# Patient Record
Sex: FEMALE | Race: BLACK OR AFRICAN AMERICAN | URBAN - METROPOLITAN AREA
[De-identification: names, ages, dates, MRNs, and addresses within clinical notes are randomized per-mention and may not be internally consistent; named-entity substitution may affect disease eponyms.]

---

## 2020-09-23 ENCOUNTER — OFFICE VISIT (OUTPATIENT)
Dept: BARIATRICS/WEIGHT MGMT | Age: 35
End: 2020-09-23
Payer: COMMERCIAL

## 2020-09-23 VITALS
BODY MASS INDEX: 39.21 KG/M2 | SYSTOLIC BLOOD PRESSURE: 126 MMHG | WEIGHT: 273.9 LBS | HEART RATE: 87 BPM | TEMPERATURE: 98 F | HEIGHT: 70 IN | DIASTOLIC BLOOD PRESSURE: 84 MMHG | OXYGEN SATURATION: 97 %

## 2020-09-23 PROCEDURE — G8417 CALC BMI ABV UP PARAM F/U: HCPCS | Performed by: NURSE PRACTITIONER

## 2020-09-23 PROCEDURE — 99204 OFFICE O/P NEW MOD 45 MIN: CPT | Performed by: NURSE PRACTITIONER

## 2020-09-23 PROCEDURE — G8427 DOCREV CUR MEDS BY ELIG CLIN: HCPCS | Performed by: NURSE PRACTITIONER

## 2020-09-23 PROCEDURE — 1036F TOBACCO NON-USER: CPT | Performed by: NURSE PRACTITIONER

## 2020-09-23 RX ORDER — ASCORBIC ACID 500 MG
500 TABLET ORAL DAILY
COMMUNITY

## 2020-09-23 SDOH — HEALTH STABILITY: MENTAL HEALTH: HOW OFTEN DO YOU HAVE A DRINK CONTAINING ALCOHOL?: NEVER

## 2020-09-23 NOTE — PROGRESS NOTES
Subjective     Chief Complaint   Patient presents with    Weight Management     New Medication Consult       Vitals:    09/23/20 0921   BP: 126/84   Pulse: 87   Temp: 98 °F (36.7 °C)   SpO2: 97%     Wt Readings from Last 3 Encounters:   09/23/20 273 lb 14.4 oz (124.2 kg)     Weight Loss Program History:  The patient is a 30 y.o. female being seen regarding our weight loss program.  The patient's PCP is Ian Lynne) and presents on new referral.  The patient first recognized that she had a weight problem about 10 years ago. The patient's lowest weight in the last five years was 220 lbs, and the highest weight in the last five years was 275 lbs. Argelia's current Body mass index is 39.3 kg/m² (9/23/20). HPI:  The patient states she has tried personal training, self dieting. These attempts have not been successful with weight loss, but have failed to sustain adequate weight loss. No Psychiatric history. History of eating disorders  Only on MVIs daily. No medication hx. No prior weight loss medications.  3 times per week, started in July. Consistent, no current weight loss. No specific diet plan, lunch coffee and granola bar or sandwich. Dinner usually pasta/vegetables. Water intake is good. No fried foods. Rarely eats out. Comorbid Conditions:  Significant diseases effecting this patient are History reviewed. No pertinent past medical history. .    Thoroughly reviewed the patient's medical history, family history, social history and review of systems with the patient today in the office. Please see medical record for pertinent positives. Allergies:   Allergies   Allergen Reactions    Ibuprofen Hives and Itching           Sulfa Antibiotics Hives and Itching     Body shaking       Past Surgical History:  Past Surgical History:   Procedure Laterality Date    CHOLECYSTECTOMY  12/2019    MENISCECTOMY Right 07/2018       Family History:  Family History   Problem pattern changes, mood swings or polydipsia/polyuria  Respiratory ROS: no cough, shortness of breath, or wheezing  Cardiovascular ROS: no chest pain or dyspnea on exertion  Gastrointestinal ROS: no abdominal pain, change in bowel habits, or black or bloody stools  Genito-Urinary ROS: no dysuria, incontinence, trouble voiding, or hematuria  Musculoskeletal ROS: Negative for joint pain or myalgia  Neurological ROS: negative for - behavioral changes, dizziness, headaches, impaired coordination/balance, numbness/tingling or seizures  Dermatological ROS: negative for - eczema or nail changes  Psychological ROS: negative for - anxiety, depression or suicidal ideation     Objective     Physical Exam   Constitutional: Oriented to person, place, and time and well-developed, well-nourished, and in no distress. No distress. Obese   HENT:   Head: Normocephalic and atraumatic. Eyes: Conjunctivae are normal. Pupils are equal, round, and reactive to light. Neck: Normal range of motion. Neck supple. Cardiovascular: Normal rate, regular rhythm, normal heart sounds and intact distal pulses. No murmur heard. Pulmonary/Chest: Effort normal and breath sounds normal. No respiratory distress. Abdominal: Soft. Bowel sounds are normal. Exhibits no distension. There is no tenderness. Large. Musculoskeletal: Exhibits no edema or tenderness. Lymphadenopathy: Deferred. Neurological: She is alert and oriented to person, place, and time. No cranial nerve deficit. Skin: Skin is warm. She is not diaphoretic. Psychiatric: Mood, memory, affect and judgment normal.     Assessment  and Plan        Obesity with a BMI of Body mass index is 39.3 kg/m². Discussed candidacy for weight loss medication: BMI of >30 at 39. No history of CAD, uncontrolled HTN, hyperthyroidism, MAOI therapy, glaucoma or hx of drug abuse. Discussed with patient, medication only approved for short term, 12 weeks.     Patient is a good candidate for Adipex weight loss medication, along with nutrition consult, and detailed diet plan. Patient given medication handout today and discussed the below information. Discussed possible side effects with patient in length. Most common side effects discussed but not limited to; insomnia, dry mouth, constipation, nervousness, increase in HR and HTN. Patient was informed to call with any of the above side effects or other concerns. Patient aware of the medication compliance guidelines below and will be removed from Adipex if the patient does not comply;   - Must meet minimum monthly with provider, weekly when starting adipex x1 month. - Current birth control measures none discussed. - Urine drug with no past hx of any substance abuse. - OARRS. - Cannot be break in therapy, 3 months unless (illness of patient or unavailability of provider and needs to be documented). - Any concerns of ETOH or drug abuse must d/c. Patient aware, Needs to show at least monthly weight progress with weigh ins. Plan    1. Morbid obesity due to excess calories ([address]  - See below. - Vitamin D 25 Hydroxy; Future  - Vitamin B12 & Folate; Future  - TSH without Reflex; Future  - Comprehensive Metabolic Panel; Future  - CBC Auto Differential; Future  - Lipid Panel; Future  - EKG 12 Lead; Future  - Urine Drug Screen; Future  - Pregnancy, Urine; Future    2. Weight gain  - Planning medication management. - Discussed weight loss program with patient and the metabolic components of obesity and insulin resistance over time. - Ultimately will need to change overall eating behaviors to have success in continued management with weight loss. - Will continue to journal food items and discussed the below recommendations to patient. - All questions answered and overall appears very receptive.   - Planning adipex and then qsymia. Given handouts. Needs labs, EKG, UDS and urine preg. RTC for virtual class in 2 wks.      Patient was encouraged to journal all food intake using myfitness pal. Keep calorie level at approximately 3689-0643. Protein intake is to be a minimum of 60 grams per day. Water drinking was encouraged with a goal of 64oz-128oz daily. Beverages to be calorie free except for milk. Every other beverage should be water. They are to avoid soda. Continue to increase level of physical activity. I spent 45 minutes with patient and more than 50% of the office visit today was spent in face to face counseling regarding diet and exercise, counting calories, complying with the diet recommendations. Patient counseled on the benefits of treatment plan at length while in the office today. Patient states an understanding and willingness to proceed with the recommended weight loss plan. No orders of the defined types were placed in this encounter. Orders Placed This Encounter   Procedures    Vitamin D 25 Hydroxy     Standing Status:   Future     Standing Expiration Date:   9/23/2021    Vitamin B12 & Folate     Standing Status:   Future     Standing Expiration Date:   9/23/2021    TSH without Reflex     Standing Status:   Future     Standing Expiration Date:   9/23/2021    Comprehensive Metabolic Panel     Standing Status:   Future     Standing Expiration Date:   9/23/2021    CBC Auto Differential     Standing Status:   Future     Standing Expiration Date:   9/23/2021    Lipid Panel     Standing Status:   Future     Standing Expiration Date:   9/23/2021     Order Specific Question:   Is Patient Fasting?/# of Hours     Answer:   12    Urine Drug Screen     Standing Status:   Future     Standing Expiration Date:   9/23/2021    Pregnancy, Urine     Standing Status:   Future     Standing Expiration Date:   9/23/2021    EKG 12 Lead     Standing Status:   Future     Standing Expiration Date:   11/22/2020     Order Specific Question:   Reason for Exam?     Answer:    Other     Comments:   medication management       Follow Up:  Return in about 2 weeks (around 10/7/2020) for New Medication Group Class.     Yifan Griggs, CNP

## 2020-09-24 LAB
6-ACETYLMORPHINE, UR: NEGATIVE
ALBUMIN SERPL-MCNC: 3.9 G/DL
ALP BLD-CCNC: 62 U/L
ALT SERPL-CCNC: 15 U/L
AMPHETAMINE SCREEN, URINE: NEGATIVE
ANION GAP SERPL CALCULATED.3IONS-SCNC: 10 MMOL/L
AST SERPL-CCNC: 16 U/L
BARBITURATE SCREEN, URINE: NEGATIVE
BASOPHILS ABSOLUTE: 0.04 /ΜL
BASOPHILS RELATIVE PERCENT: 1 %
BENZODIAZEPINE SCREEN, URINE: NEGATIVE
BILIRUB SERPL-MCNC: 0.4 MG/DL (ref 0.1–1.4)
BUN BLDV-MCNC: 8 MG/DL
CALCIUM SERPL-MCNC: 9.5 MG/DL
CANNABINOID SCREEN URINE: NEGATIVE
CHLORIDE BLD-SCNC: 107 MMOL/L
CHOLESTEROL, TOTAL: 211 MG/DL
CHOLESTEROL/HDL RATIO: 3.3
CO2: 25 MMOL/L
COCAINE METABOLITE, URINE: NEGATIVE
CREAT SERPL-MCNC: 0.76 MG/DL
CREATININE URINE: NORMAL
EDDP, URINE: NEGATIVE
EOSINOPHILS ABSOLUTE: 0.22 /ΜL
EOSINOPHILS RELATIVE PERCENT: 5.4 %
ETHANOL URINE: NEGATIVE
FOLATE: 15.39
GFR CALCULATED: >60
GLUCOSE BLD-MCNC: 89 MG/DL
HCT VFR BLD CALC: 35 % (ref 36–46)
HDLC SERPL-MCNC: 63 MG/DL (ref 35–70)
HEMOGLOBIN: 9.8 G/DL (ref 12–16)
LDL CHOLESTEROL CALCULATED: 137 MG/DL (ref 0–160)
LYMPHOCYTES ABSOLUTE: 1.63 /ΜL
LYMPHOCYTES RELATIVE PERCENT: 40 %
MAGNESIUM: 1.7 MG/DL
MCH RBC QN AUTO: 22.2 PG
MCHC RBC AUTO-ENTMCNC: 28 G/DL
MCV RBC AUTO: 79.2 FL
MDMA URINE: NEGATIVE
METHADONE SCREEN, URINE: NEGATIVE
METHAMPHETAMINE, URINE: NEGATIVE
MONOCYTES ABSOLUTE: 0.5 /ΜL
MONOCYTES RELATIVE PERCENT: 12.3 %
NEUTROPHILS ABSOLUTE: 1.68 /ΜL
NEUTROPHILS RELATIVE PERCENT: 41.3 %
NONHDLC SERPL-MCNC: 148 MG/DL
OPIATES, URINE: NEGATIVE
OXYCODONE: NEGATIVE
PCP: NEGATIVE
PDW BLD-RTO: 17.2 %
PH, URINE: NORMAL
PHENCYCLIDINE, URINE: NEGATIVE
PLATELET # BLD: 315 K/ΜL
PMV BLD AUTO: 11.1 FL
POTASSIUM SERPL-SCNC: 4.9 MMOL/L
PROPOXYPHENE, URINE: NEGATIVE
RBC # BLD: 4.42 10^6/ΜL
SODIUM BLD-SCNC: 137 MMOL/L
TOTAL PROTEIN: 6.6
TRICYCLIC ANTIDEPRESSANTS, UR: NEGATIVE
TRIGL SERPL-MCNC: 53 MG/DL
TSH SERPL DL<=0.05 MIU/L-ACNC: 0.86 UIU/ML
VITAMIN B-12: 477
VITAMIN D 25-HYDROXY: 21.9
VITAMIN D2, 25 HYDROXY: ABNORMAL
VITAMIN D3,25 HYDROXY: ABNORMAL
VLDLC SERPL CALC-MCNC: ABNORMAL MG/DL
WBC # BLD: 4.07 10^3/ML

## 2020-10-05 ENCOUNTER — TELEMEDICINE (OUTPATIENT)
Dept: BARIATRICS/WEIGHT MGMT | Age: 35
End: 2020-10-05
Payer: COMMERCIAL

## 2020-10-05 PROCEDURE — G8428 CUR MEDS NOT DOCUMENT: HCPCS | Performed by: NURSE PRACTITIONER

## 2020-10-05 PROCEDURE — 99215 OFFICE O/P EST HI 40 MIN: CPT | Performed by: NURSE PRACTITIONER

## 2020-10-05 ASSESSMENT — ENCOUNTER SYMPTOMS
EYE PAIN: 0
ABDOMINAL DISTENTION: 0
NAUSEA: 0
DIARRHEA: 0
CHEST TIGHTNESS: 0
SHORTNESS OF BREATH: 0
RHINORRHEA: 0
ABDOMINAL PAIN: 0
WHEEZING: 0
TROUBLE SWALLOWING: 0

## 2020-10-05 NOTE — PROGRESS NOTES
10/5/2020    TELEHEALTH EVALUATION -- Audio/Visual (During WAAZN-88 public health emergency)    HPI:    Samy Brower (:  1985) has requested an audio/video evaluation for the following concern(s):    Patient had an initial individual provider consult and is here for their virtual education class. At initial consult weight loss medication adipex was decided based on current BMI, prior attempts, and exclusion criteria based on past medical history. Patient had work up completed and was reviewed with patient today. EKG and workup normal. Was having palpitations with covid. Will have holtzer monitor. Review of Systems   Constitutional: Negative. Negative for appetite change, fatigue and fever. HENT: Negative for congestion, dental problem, hearing loss, rhinorrhea and trouble swallowing. Eyes: Negative for pain. Respiratory: Negative for chest tightness, shortness of breath and wheezing. Cardiovascular: Negative for chest pain, palpitations and leg swelling. Gastrointestinal: Negative for abdominal distention, abdominal pain, diarrhea and nausea. Endocrine: Negative for cold intolerance and polydipsia. Genitourinary: Negative for difficulty urinating and frequency. Musculoskeletal: Negative for arthralgias and gait problem. Skin: Negative for rash. Allergic/Immunologic: Negative for environmental allergies. Neurological: Negative for dizziness, seizures and syncope. Hematological: Does not bruise/bleed easily. Psychiatric/Behavioral: Negative for behavioral problems and suicidal ideas. Prior to Visit Medications    Medication Sig Taking?  Authorizing Provider   ELDERBERRY PO Take by mouth  Historical Provider, MD   vitamin C (ASCORBIC ACID) 500 MG tablet Take 500 mg by mouth daily  Historical Provider, MD   Multiple Vitamins-Minerals (MULTIPLE VITAMINS/WOMENS PO) Take by mouth  Historical Provider, MD       Social History     Tobacco Use    Smoking status: Never Smoker  Smokeless tobacco: Never Used   Substance Use Topics    Alcohol use: Never     Frequency: Never    Drug use: Never        Allergies   Allergen Reactions    Ibuprofen Hives and Itching           Sulfa Antibiotics Hives and Itching     Body shaking   , No past medical history on file.,   Past Surgical History:   Procedure Laterality Date    CHOLECYSTECTOMY  12/2019    MENISCECTOMY Right 07/2018       PHYSICAL EXAMINATION:  Physical Exam  Constitutional:       Appearance: She is well-developed. Comments: Obese   HENT:      Head: Normocephalic and atraumatic. Right Ear: Hearing and ear canal normal.      Left Ear: Hearing and ear canal normal.      Nose: Nose normal.      Eyes:      Conjunctiva/sclera: Conjunctivae normal.   Neck:      Musculoskeletal: Normal range of motion. Cardiovascular:   N/A  Pulmonary:      Effort: Pulmonary effort is normal.   Musculoskeletal: Normal range of motion. Comments: In all 4 extremities. Skin:     General: Skin is warm and dry. Findings: No rash. Neurological:      Mental Status: She is alert and oriented to person, place, and time. GCS: GCS eye subscore is 4. GCS verbal subscore is 5. GCS motor subscore is 6. Motor: No abnormal muscle tone. Psychiatric:         Behavior: Behavior normal.         Judgment: Judgment normal.     Limited due to virtual visit. ASSESSMENT/PLAN:  1. Morbid obesity due to excess calories ([address]   - Planning adipex and then qsymia once holtzer monitor completed and normal per PCP. Will review report once completed. Patient aware and will inform us once this is completed. 2. Weight gain    - Patient attended weight loss medication virtual class today. - Educated on importance of 2 forms of contraception, patient verbalizes. - Discussed possible side effects with patient in length via power point.    - Will monitor weight and vital signs weekly.  Patient informed of importance and compliance with weekly weight and vital checks. - Obesity with a BMI of 39.   - Patient educated in depth on defining obesity and the risk factors associated when BMI >30 via power point.     - Recommend MVI Ca/Vit D daily.   - Pt instructed on healthy diet to support weight loss. Instructed on goal calorie intake, not less than 1,000 kcals daily. Educated on healthy diet framework to include adequate lean protein, non-starchy vegetables, and moderate carbohydrate and fruit intake. - Instructed on fluid intake at least 64 oz daily. Patient instructed to refrain from any calorie enriched drinks. Recommend using meal replacements, 1-3 daily to support maintaining adequate protein and calorie goals. - Patient also educated on celebrate products sold in house and appropriate recommendations for vitamins and meal replacement shakes. - Patient was also informed on weight loss program and specific requirements to be met by all. Weight loss program contract signed prior. Patient aware of the medication compliance guidelines and will be removed from medication if the patient does not comply and medication handout given today. Patient was seen with total face to face time of 40 minutes in a group setting. More than 50% of this visit was counseling on obesity, strict diet recommendations, exercise, current medication usage, possible side effects, nutrition and education as above in my assessment and plan section of my note. Return in about 1 month (around 11/5/2020) for Weight Check. Poonam Chinchilla is a 30 y.o. female being evaluated by a Virtual Visit (video visit) encounter to address concerns as mentioned above. A caregiver was present when appropriate. Due to this being a TeleHealth encounter (During NZJIY-24 public health emergency), evaluation of the following organ systems was limited: Vitals/Constitutional/EENT/Resp/CV/GI//MS/Neuro/Skin/Heme-Lymph-Imm.   Pursuant to the emergency declaration under the [facility] waiver authority and the [employer] and [employer] Act, this Virtual Visit was conducted with patient's (and/or legal guardian's) consent, to reduce the patient's risk of exposure to COVID-19 and provide necessary medical care. The patient (and/or legal guardian) has also been advised to contact this office for worsening conditions or problems, and seek emergency medical treatment and/or call 911 if deemed necessary. Patient identification was verified at the start of the visit: Yes    Total time spent on this encounter: 36    Services were provided through a video synchronous discussion virtually to substitute for in-person clinic visit. Patient and provider were located at their individual homes.     --VIKASH Daniels CNP on 10/5/2020 at 8:50 AM

## 2021-03-22 ENCOUNTER — OFFICE VISIT (OUTPATIENT)
Dept: BARIATRICS/WEIGHT MGMT | Age: 36
End: 2021-03-22
Payer: COMMERCIAL

## 2021-03-22 VITALS
SYSTOLIC BLOOD PRESSURE: 120 MMHG | BODY MASS INDEX: 37.69 KG/M2 | HEART RATE: 87 BPM | OXYGEN SATURATION: 98 % | DIASTOLIC BLOOD PRESSURE: 72 MMHG | TEMPERATURE: 98.6 F | WEIGHT: 263.3 LBS | HEIGHT: 70 IN

## 2021-03-22 DIAGNOSIS — R63.5 WEIGHT GAIN: Primary | ICD-10-CM

## 2021-03-22 DIAGNOSIS — E66.01 MORBID OBESITY DUE TO EXCESS CALORIES (HCC): ICD-10-CM

## 2021-03-22 PROCEDURE — 1036F TOBACCO NON-USER: CPT | Performed by: NURSE PRACTITIONER

## 2021-03-22 PROCEDURE — 99214 OFFICE O/P EST MOD 30 MIN: CPT | Performed by: NURSE PRACTITIONER

## 2021-03-22 PROCEDURE — G8484 FLU IMMUNIZE NO ADMIN: HCPCS | Performed by: NURSE PRACTITIONER

## 2021-03-22 PROCEDURE — G8417 CALC BMI ABV UP PARAM F/U: HCPCS | Performed by: NURSE PRACTITIONER

## 2021-03-22 PROCEDURE — G8427 DOCREV CUR MEDS BY ELIG CLIN: HCPCS | Performed by: NURSE PRACTITIONER

## 2021-03-22 ASSESSMENT — ENCOUNTER SYMPTOMS
TROUBLE SWALLOWING: 0
CHEST TIGHTNESS: 0
ABDOMINAL PAIN: 0
NAUSEA: 0
DIARRHEA: 0
EYE PAIN: 0
SHORTNESS OF BREATH: 0
ABDOMINAL DISTENTION: 0
RHINORRHEA: 0
WHEEZING: 0

## 2021-03-22 NOTE — PROGRESS NOTES
[facility]  1985  30 y.o. SUBJECT LUISITO:    Chief Complaint   Patient presents with    Other     Office visit extended  Restart medication     History reviewed. No pertinent past medical history. Past Surgical History:   Procedure Laterality Date    CHOLECYSTECTOMY  12/2019    MENISCECTOMY Right 07/2018     Current Outpatient Medications   Medication Sig Dispense Refill    Phentermine-Topiramate 3.75-23 MG CP24 Take 1 tablet by mouth daily for 14 days. Take in am. 14 capsule 0    Phentermine-Topiramate 7.5-46 MG CP24 Take 1 tablet by mouth daily for 30 days. Take in am. 30 capsule 0    ELDERBERRY PO Take by mouth      vitamin C (ASCORBIC ACID) 500 MG tablet Take 500 mg by mouth daily      Multiple Vitamins-Minerals (MULTIPLE VITAMINS/WOMENS PO) Take by mouth       No current facility-administered medications for this visit. Family History   Problem Relation Age of Onset    Lupus Mother     Cancer Mother         non hodgkin's lymphoma    Cancer Father         Prostate    Lupus Sister     Mult Sclerosis Sister     Alzheimer's Disease Maternal Grandmother      Allergies   Allergen Reactions    Ibuprofen Hives and Itching           Sulfa Antibiotics Hives and Itching     Body shaking        HPI  Patient presents today for follow up visit. Wanting to restart program. Recently completed adipex. States lost minimal weight. No calories in drinks. Water intake is good. Patient dines out to a sit down restaurant 1 times per month. Patient eats fast food meals 0 times per week. Drinks mostly water coffee tea    24 hour recall/food frequency chart:  Breakfast: oatmeal and coffee  Snack: none  Lunch: none  Snack: none  Dinner: steak, broccoli, mac and cheese  Snack: none    Total daily calories: 1500/1400      Exercises twice weekly. Review of Systems   Constitutional: Negative. Negative for appetite change, fatigue and fever.    HENT: Negative for congestion, dental problem, hearing loss, rhinorrhea and trouble swallowing. Eyes: Negative for pain. Respiratory: Negative for chest tightness, shortness of breath and wheezing. Cardiovascular: Negative for chest pain, palpitations and leg swelling. Gastrointestinal: Negative for abdominal distention, abdominal pain, diarrhea and nausea. Endocrine: Negative for cold intolerance and polydipsia. Genitourinary: Negative for difficulty urinating and frequency. Musculoskeletal: Negative for arthralgias and gait problem. Skin: Negative for rash. Allergic/Immunologic: Negative for environmental allergies. Neurological: Negative for dizziness, seizures and syncope. Hematological: Does not bruise/bleed easily. Psychiatric/Behavioral: Negative for behavioral problems and suicidal ideas. OBJECTIVE:     /72   Pulse 87   Temp 98.6 °F (37 °C)   Ht 5' 10\" (1.778 m)   Wt 263 lb 4.8 oz (119.4 kg)   LMP 02/24/2021   SpO2 98%   BMI 37.78 kg/m²   Wt Readings from Last 3 Encounters:   03/22/21 263 lb 4.8 oz (119.4 kg)   09/23/20 273 lb 14.4 oz (124.2 kg)       Physical Exam  Vitals signs and nursing note reviewed. Constitutional:       Appearance: She is well-developed. Comments: Obese   HENT:      Head: Normocephalic and atraumatic. Right Ear: Hearing and ear canal normal.      Left Ear: Hearing and ear canal normal.      Nose: Nose normal.      Mouth/Throat:      Pharynx: Uvula midline. Eyes:      Conjunctiva/sclera: Conjunctivae normal.      Pupils: Pupils are equal, round, and reactive to light. Neck:      Musculoskeletal: Normal range of motion. Cardiovascular:      Rate and Rhythm: Normal rate and regular rhythm. Heart sounds: Normal heart sounds. Pulmonary:      Effort: Pulmonary effort is normal.      Breath sounds: Normal breath sounds. No decreased breath sounds, wheezing, rhonchi or rales. Abdominal:      General: Bowel sounds are normal.      Palpations: Abdomen is soft. Tenderness: There is no abdominal tenderness. Musculoskeletal: Normal range of motion. General: No tenderness. Comments: In all 4 extremities. Skin:     General: Skin is warm and dry. Findings: No rash. Neurological:      Mental Status: She is alert and oriented to person, place, and time. GCS: GCS eye subscore is 4. GCS verbal subscore is 5. GCS motor subscore is 6. Motor: No abnormal muscle tone. Psychiatric:         Behavior: Behavior normal.         Judgment: Judgment normal.       Previous EKG with NS.     ASSESSMENT/ PLAN:    1. Morbid obesity due to excess calories ([address]  - Completed adipex. Will plan to transition to qsymia. Obesity with a BMI of Body mass index is 37.78 kg/m². Discussed candidacy for weight loss medication: BMI of >30 at 37. No history of CAD, seizures, liver or kidney problems, cardiac hx, uncontrolled HTN, hyperthyroidism, MAOI therapy, glaucoma or hx of drug abuse. Study was 1 year long, not studied >46-57 age group. Patient is a good candidate for Qsymia weight loss medication, along with nutrition consult, and detailed diet plan. Patient was given medication handout today and below information was discussed. Discussed possible side effects with patient in length. Most common side effects discussed but not limited to; insomnia, dry mouth, constipation, nervousness, increase in HR and HTN. Patient was informed to call with any of the above side effects or other concerns. D/C if weight loss <5% after 12 weeks on max dose, taper max dose to QOD>1wk to d/c. Patient aware of the medication compliance guidelines below and will be removed from Qsymia if the patient does not comply;   - Must meet minimum monthly with provider.  - Will schedule weekly for first month for weigh in and vital signs.   - Current birth control measures none- not sexually active. Refused UP.  Aware of risks and preg cat etc. Given handout and drug info card to review. - Urine drug with no past hx of any substance abuse. - OARRS. Reviewed. - Any concerns of ETOH or drug abuse must d/c.   - Patient aware, Needs to show at least monthly weight progress with weigh ins. Will meet monthly with provider for weigh in's and education on specific weight loss diet plan will be reviewed in group class. Patient was educated today on increasing protein to >60 grams per day, hydration to over 60 ounces per day, and counting calories (7213-6385) per day. - 1 on 1 with RD.   - Phentermine-Topiramate 3.75-23 MG CP24; Take 1 tablet by mouth daily for 14 days. Take in am.  Dispense: 14 capsule; Refill: 0  - Phentermine-Topiramate 7.5-46 MG CP24; Take 1 tablet by mouth daily for 30 days. Take in am.  Dispense: 30 capsule; Refill: 0  - BF% today 41.6.   - RTC 2 wks with NP, titration. 2. Weight gain  - Continue diet and weight loss. No orders of the defined types were placed in this encounter. Return in about 1 month (around 4/22/2021) for Weight Check.     Lev Stanton, CNP

## 2021-03-29 DIAGNOSIS — E66.01 MORBID OBESITY DUE TO EXCESS CALORIES (HCC): ICD-10-CM

## 2021-04-12 ENCOUNTER — TELEPHONE (OUTPATIENT)
Dept: BARIATRICS/WEIGHT MGMT | Age: 36
End: 2021-04-12

## 2021-04-12 ENCOUNTER — TELEMEDICINE (OUTPATIENT)
Dept: BARIATRICS/WEIGHT MGMT | Age: 36
End: 2021-04-12

## 2021-04-12 DIAGNOSIS — F50.89 PICA IN ADULTS: Primary | ICD-10-CM

## 2021-04-12 DIAGNOSIS — E66.01 MORBID OBESITY DUE TO EXCESS CALORIES (HCC): Primary | ICD-10-CM

## 2021-04-12 PROCEDURE — 99999 PR OFFICE/OUTPT VISIT,PROCEDURE ONLY: CPT

## 2021-04-12 NOTE — PROGRESS NOTES
Outpatient Nutrition Counseling    REASON FOR VISIT: F/U Medication Program    Chief Complaint:    Chief Complaint   Patient presents with    Weight Management       SUBJECTIVE:  Pt was seen via video for f/u in mediation program. Pt reports she is tracking her food through [employer]. Likely not consistent with intake - higher calorie foods and excess snacking at times per pt. Needs to review calorie intake. Not likely that if kcals are 1200/day that she would not be losing weight. Also - pt reports craving and actually eating paper. Asked if this would cause health issues. Pt encouraged to discuss with Maribel at next apt. Needs iron/zinc studies to r/o Pica or referral to psychology. Will discuss with NP. The patient is a 30 y.o. female being seen for morbid obesity, Medication Program; Argelia's,  ,  , Current There is no height or weight on file to calculate BMI. The patient's PCP is No primary care provider on file. Comorbid Conditions:  Significant diseases affecting this patient are No past medical history on file. .    Review of Systems - Review of Systems  Otherwise per HPI. Allergies:   Allergies   Allergen Reactions    Ibuprofen Hives and Itching           Sulfa Antibiotics Hives and Itching     Body shaking       Past Surgical History:  Past Surgical History:   Procedure Laterality Date    CHOLECYSTECTOMY  12/2019    MENISCECTOMY Right 07/2018       Family History:  Family History   Problem Relation Age of Onset    Lupus Mother     Cancer Mother         non hodgkin's lymphoma    Cancer Father         Prostate    Lupus Sister     Mult Sclerosis Sister     Alzheimer's Disease Maternal Grandmother        Social History:  Social History     Socioeconomic History    Marital status: Single     Spouse name: Not on file    Number of children: Not on file    Years of education: Not on file    Highest education level: Not on file   Occupational History    Not on file   Social Needs  Financial resource strain: Not on file   [address] insecurity     Worry: Not on file     Inability: Not on file   [employer] needs     Medical: Not on file     Non-medical: Not on file   Tobacco Use    Smoking status: Never Smoker    Smokeless tobacco: Never Used   Substance and Sexual Activity    Alcohol use: Never     Frequency: Never    Drug use: Never    Sexual activity: Not Currently   Lifestyle    Physical activity     Days per week: Not on file     Minutes per session: Not on file    Stress: Not on file   Relationships    Social connections     Talks on phone: Not on file     Gets together: Not on file     Attends [employer] service: Not on file     Active member of club or organization: Not on file     Attends meetings of clubs or organizations: Not on file     Relationship status: Not on file    Intimate partner violence     Fear of current or ex partner: Not on file     Emotionally abused: Not on file     Physically abused: Not on file     Forced sexual activity: Not on file   Other Topics Concern    Not on file   Social History Narrative    Not on file         OBJECTIVE:  Physical Exam   There were no vitals taken for this visit.        NUTRITION DIAGNOSIS: Overweight / Obesity   Problem: Increased adiposity compared to reference standard or established norms   Etiology: Excess intake compared to output over time   S/S: Ht: 70\" Wt: 263.3 lbs BMI: 37.78    NUTRITION INTERVENTIONS:    Individualized treatment goals to address nutritiondiagnosis:   Instructed on 1200 kcal diet for weight loss   Provided instruction to review calorie intake   Needs nutrition labs to r/o Pica   Encouraged Physical activityas approved by physician    MONITORING/ EVALUATION/ PLAN:   Pt verbalized understanding of allmaterials covered   Pt asked pertinent questions throughout the session - expect compliance with nutrition guidelines presented   Provided pt with contact information should questions arise prior to next visit   Will f/u with pt ANA.  Aster THOMSON, BRONWYNN, LD  4/12/2021

## 2021-04-19 ENCOUNTER — TELEMEDICINE (OUTPATIENT)
Dept: BARIATRICS/WEIGHT MGMT | Age: 36
End: 2021-04-19
Payer: COMMERCIAL

## 2021-04-19 VITALS — WEIGHT: 257 LBS | BODY MASS INDEX: 36.88 KG/M2

## 2021-04-19 DIAGNOSIS — D50.9 IRON DEFICIENCY ANEMIA, UNSPECIFIED IRON DEFICIENCY ANEMIA TYPE: ICD-10-CM

## 2021-04-19 DIAGNOSIS — E55.9 VITAMIN D DEFICIENCY: Primary | ICD-10-CM

## 2021-04-19 PROCEDURE — 99214 OFFICE O/P EST MOD 30 MIN: CPT | Performed by: NURSE PRACTITIONER

## 2021-04-19 PROCEDURE — G8428 CUR MEDS NOT DOCUMENT: HCPCS | Performed by: NURSE PRACTITIONER

## 2021-04-19 RX ORDER — FERROUS SULFATE 325(65) MG
325 TABLET ORAL 2 TIMES DAILY
Qty: 60 TABLET | Refills: 5 | Status: SHIPPED | OUTPATIENT
Start: 2021-04-19 | End: 2021-05-19

## 2021-04-19 RX ORDER — ERGOCALCIFEROL 1.25 MG/1
50000 CAPSULE ORAL WEEKLY
Qty: 12 CAPSULE | Refills: 1 | Status: SHIPPED | OUTPATIENT
Start: 2021-04-19

## 2021-04-19 ASSESSMENT — ENCOUNTER SYMPTOMS
RHINORRHEA: 0
NAUSEA: 0
WHEEZING: 0
EYE PAIN: 0
TROUBLE SWALLOWING: 0
ABDOMINAL PAIN: 0
ABDOMINAL DISTENTION: 0
CHEST TIGHTNESS: 0
SHORTNESS OF BREATH: 0
DIARRHEA: 0

## 2021-04-19 NOTE — PROGRESS NOTES
Historical Provider, MD   Multiple Vitamins-Minerals (MULTIPLE VITAMINS/WOMENS PO) Take by mouth  Historical Provider, MD       Social History     Tobacco Use    Smoking status: Never Smoker    Smokeless tobacco: Never Used   Substance Use Topics    Alcohol use: Never     Frequency: Never    Drug use: Never        Allergies   Allergen Reactions    Ibuprofen Hives and Itching           Sulfa Antibiotics Hives and Itching     Body shaking   , No past medical history on file.,   Past Surgical History:   Procedure Laterality Date    CHOLECYSTECTOMY  12/2019    MENISCECTOMY Right 07/2018   ,   Social History     Tobacco Use    Smoking status: Never Smoker    Smokeless tobacco: Never Used   Substance Use Topics    Alcohol use: Never     Frequency: Never    Drug use: Never       PHYSICAL EXAMINATION:  Physical Exam  Constitutional:       Appearance: She is well-developed. Comments: Obese   HENT:      Head: Normocephalic and atraumatic. Right Ear: Hearing and ear canal normal.      Left Ear: Hearing and ear canal normal.      Nose: Nose normal.      Eyes:      Conjunctiva/sclera: Conjunctivae normal.   Neck:      Musculoskeletal: Normal range of motion. Cardiovascular:   N/A  Pulmonary:      Effort: Pulmonary effort is normal.   Musculoskeletal: Normal range of motion. Comments: In all 4 extremities. Skin:     General: Skin is warm and dry. Findings: No rash. Neurological:      Mental Status: She is alert and oriented to person, place, and time. GCS: GCS eye subscore is 4. GCS verbal subscore is 5. GCS motor subscore is 6. Motor: No abnormal muscle tone. Psychiatric:         Behavior: Behavior normal.         Judgment: Judgment normal.       ASSESSMENT/PLAN:  1. Vitamin D deficiency  - very low, will supplement. - vitamin D (ERGOCALCIFEROL) 1.25 MG (38659 UT) CAPS capsule; Take 1 capsule by mouth once a week  Dispense: 12 capsule;  Refill: 1  - ferrous sulfate (IRON 325) 325 (65 Fe) MG tablet; Take 1 tablet by mouth 2 times daily Take with food. Dispense: 60 tablet; Refill: 5    2. Iron deficiency anemia, unspecified iron deficiency anemia type  - Will supplement, instructed. The side effects of iron are discussed, primarily GI in type, such as cramping, constipation, black stools. Start with low dose of ferrous sulfate 325 mgm once daily, and if tolerated, increase dose to 2-3 tabs daily. - vitamin D (ERGOCALCIFEROL) 1.25 MG (24533 UT) CAPS capsule; Take 1 capsule by mouth once a week  Dispense: 12 capsule; Refill: 1  - ferrous sulfate (IRON 325) 325 (65 Fe) MG tablet; Take 1 tablet by mouth 2 times daily Take with food. Dispense: 60 tablet; Refill: 5  - reviewed labs in detail.   - needs vitals sent. - Weekly as well. - Continue qsymia.   - RTC 3 wks with NP, in person. Return in about 3 weeks (around 5/10/2021) for Weight Check. Dana Richardson was evaluated through a synchronous (real-time) audio-video encounter. The patient (or guardian if applicable) is aware that this is a billable service. Verbal consent to proceed has been obtained within the past 12 months. The visit was conducted pursuant to the emergency declaration under the [facility], [address] [place] [address] authority and the [employer] General Act. Patient identification was verified, and a caregiver was present when appropriate. The patient was located in a state where the provider was credentialed to provide care.     Total time spent on this encounter: Eli Bingham 27, APRN - CNP on 4/19/2021 at 5:46 PM

## 2021-05-14 ENCOUNTER — OFFICE VISIT (OUTPATIENT)
Dept: BARIATRICS/WEIGHT MGMT | Age: 36
End: 2021-05-14
Payer: COMMERCIAL

## 2021-05-14 VITALS
TEMPERATURE: 97.2 F | HEIGHT: 70 IN | HEART RATE: 79 BPM | OXYGEN SATURATION: 99 % | DIASTOLIC BLOOD PRESSURE: 88 MMHG | BODY MASS INDEX: 36.86 KG/M2 | SYSTOLIC BLOOD PRESSURE: 130 MMHG | WEIGHT: 257.5 LBS

## 2021-05-14 DIAGNOSIS — D50.9 IRON DEFICIENCY ANEMIA, UNSPECIFIED IRON DEFICIENCY ANEMIA TYPE: ICD-10-CM

## 2021-05-14 DIAGNOSIS — E55.9 VITAMIN D DEFICIENCY: ICD-10-CM

## 2021-05-14 DIAGNOSIS — R63.5 WEIGHT GAIN: Primary | ICD-10-CM

## 2021-05-14 PROCEDURE — 99213 OFFICE O/P EST LOW 20 MIN: CPT | Performed by: NURSE PRACTITIONER

## 2021-05-14 PROCEDURE — G8427 DOCREV CUR MEDS BY ELIG CLIN: HCPCS | Performed by: NURSE PRACTITIONER

## 2021-05-14 PROCEDURE — G8417 CALC BMI ABV UP PARAM F/U: HCPCS | Performed by: NURSE PRACTITIONER

## 2021-05-14 PROCEDURE — 1036F TOBACCO NON-USER: CPT | Performed by: NURSE PRACTITIONER

## 2021-05-14 ASSESSMENT — ENCOUNTER SYMPTOMS
DIARRHEA: 0
APNEA: 0
SHORTNESS OF BREATH: 0
SORE THROAT: 0
NAUSEA: 0
COLOR CHANGE: 0
BACK PAIN: 0
CHEST TIGHTNESS: 0
WHEEZING: 0
PHOTOPHOBIA: 0

## 2021-05-14 NOTE — PROGRESS NOTES
Freddy Chamberlain  1985  30 y.o.    HPI Here today for weigh in.  Diet- so far so good. No issues  Water intake is okay. Could be better. Still going to the 3 days a week and walking outside as well  Using protein powder and smoothies  Tolerating the medication well. No ill side effects  - 5 lbs since last visit    Patient dines out to a sit down restaurant 1 times per month. Patient eats fast food meals 0 times per week.       Drinks mostly water, coffee     24 hour recall/food frequency chart:  Breakfast: smoothie  Snack: breakfast burrito  Lunch: none  Snack: none  Dinner: pasta, w/chicken spinach and feta  Snack: none     Total daily calories: 1200      Exercises 3 times weekly plus a mile and a half of walking       SUBJECT LUISITO:    Chief Complaint   Patient presents with    Follow-up     f/u rtc 3 weeks with NP, in person, Qsymia     No past medical history on file. Past Surgical History:   Procedure Laterality Date    CHOLECYSTECTOMY  12/2019    MENISCECTOMY Right 07/2018     Current Outpatient Medications   Medication Sig Dispense Refill    vitamin D (ERGOCALCIFEROL) 1.25 MG (13839 UT) CAPS capsule Take 1 capsule by mouth once a week 12 capsule 1    ferrous sulfate (IRON 325) 325 (65 Fe) MG tablet Take 1 tablet by mouth 2 times daily Take with food. 60 tablet 5    ELDERBERRY PO Take by mouth      vitamin C (ASCORBIC ACID) 500 MG tablet Take 500 mg by mouth daily      Multiple Vitamins-Minerals (MULTIPLE VITAMINS/WOMENS PO) Take by mouth       No current facility-administered medications for this visit.       Family History   Problem Relation Age of Onset    Lupus Mother     Cancer Mother         non hodgkin's lymphoma    Cancer Father         Prostate    Lupus Sister     Mult Sclerosis Sister     Alzheimer's Disease Maternal Grandmother      Allergies   Allergen Reactions    Ibuprofen Hives and Itching           Sulfa Antibiotics Hives and Itching     Body shaking        Review of Systems   Constitutional: Negative for fatigue and fever. HENT: Negative for congestion, dental problem and sore throat. Eyes: Negative for photophobia and visual disturbance. Respiratory: Negative for apnea, chest tightness, shortness of breath and wheezing. Cardiovascular: Negative for chest pain and leg swelling. Gastrointestinal: Negative for diarrhea and nausea. Endocrine: Negative for cold intolerance and heat intolerance. Genitourinary: Negative for difficulty urinating, dysuria, flank pain, frequency and hematuria. Musculoskeletal: Negative for arthralgias and back pain. Skin: Negative for color change, rash and wound. Allergic/Immunologic: Negative for environmental allergies, food allergies and immunocompromised state. Neurological: Negative for dizziness, weakness, light-headedness and numbness. Hematological: Negative for adenopathy. Does not bruise/bleed easily. Psychiatric/Behavioral: Negative for behavioral problems, confusion, sleep disturbance and suicidal ideas. OBJECTIVE:     /88   Pulse 79   Temp 97.2 °F (36.2 °C)   Ht 5' 10\" (1.778 m)   Wt 257 lb 8 oz (116.8 kg)   LMP 04/27/2021   SpO2 99%   BMI 36.95 kg/m²   Wt Readings from Last 3 Encounters:   05/14/21 257 lb 8 oz (116.8 kg)   04/19/21 257 lb (116.6 kg)   03/22/21 263 lb 4.8 oz (119.4 kg)       Physical Exam  Vitals signs reviewed. Constitutional:       Appearance: She is obese. HENT:      Head: Normocephalic and atraumatic. Right Ear: External ear normal.      Left Ear: External ear normal.      Nose: Nose normal.      Mouth/Throat:      Mouth: Mucous membranes are moist.   Eyes:      Extraocular Movements: Extraocular movements intact. Pupils: Pupils are equal, round, and reactive to light. Neck:      Musculoskeletal: Normal range of motion and neck supple. Cardiovascular:      Rate and Rhythm: Normal rate and regular rhythm. Pulses: Normal pulses.       Heart sounds: Normal heart sounds. Pulmonary:      Effort: Pulmonary effort is normal.      Breath sounds: Normal breath sounds. Abdominal:      General: Bowel sounds are normal.   Musculoskeletal: Normal range of motion. Skin:     General: Skin is warm and dry. Neurological:      General: No focal deficit present. Mental Status: She is alert and oriented to person, place, and time. Mental status is at baseline. Psychiatric:         Mood and Affect: Mood normal.         Behavior: Behavior normal.         Patient to continue weight loss medication, BMI 36.95  Denies any new medications or new health problems. No history of CAD, seizures, liver or kidney problems, cardiac hx, uncontrolled HTN, hyperthyroidism, MAOI therapy, glaucoma or hx of drug abuse. Patient declines any of the most common side effects discussed but not limited to; insomnia, dry mouth, constipation, nervousness, increase in HR and HTN. Patient was informed to call with any of the above side effects or other concerns. Patient aware of the monthly medication compliance guidelines below and will be removed from Qsymia if the patient does not comply;   - Must meet minimum monthly with provider.  - Will schedule weekly for first month for weigh in and vital signs.   - Current birth control measures. - Urine drug with no past hx of any substance abuse. - OARRS. - Any concerns of ETOH or drug abuse must d/c.   - Patient aware, Needs to show at least monthly weight progress with weigh ins. Will continue to meet monthly with provider for weigh in's and education on specific weight loss diet plan will be reviewed in group class. Patient was educated today on increasing protein to >60 grams per day, hydration to over 60 ounces per day, and counting calories (3627-0869) per day.      Weight GAIN medications to consider; MAOIs tricyclic antidepressants: A lot of antipsychotics, prednisone, hormonal agents especially progestins and medroxyprogesterone. Weight LOSS facilitators to consider or alternatives, Effexor, Zoloft and Prozac, and Wellbutrin best for depression/anxiety. Antipsychotics Ziprasidone. Phentermine 3.75 mg/topiramate 23 mg once daily for 14 days, re-visit with provider. Increase dose to phentermine 7.5 mg/topiramate 46 mg once daily for 12 weeks. D/C if weight loss <5% after 12 weeks on max dose, taper max dose to QOD>1wk to d/c.     ASSESSMENT/ PLAN:    1. Weight gain  - Continue diet and weight loss. - Qysmia RX refilled  - Continue to monitor for ill side effects  - Continue to monitor HR and BP    2. Vitamin D deficiency  - Was very low. Supplemented  - Using OTC gummie vitamin    3. Iron deficiency anemia, unspecified iron deficiency anemia type  - Did not fill iron RX.  - Did find an Iron gummie that she likes not as strong of a dose of prescription.  - Educated on iron rich foods. No orders of the defined types were placed in this encounter. Return in about 1 month (around 6/14/2021) for weight check.     Sandy Menchaca, CNP

## 2021-05-14 NOTE — PROGRESS NOTES
Patient dines out to a sit down restaurant 1 times per month. Patient eats fast food meals 0 times per week.       Drinks mostly water, coffee    24 hour recall/food frequency chart:  Breakfast: smoothie  Snack: breakfast burrito  Lunch: none  Snack: none  Dinner: pasta, w/chicken spinach and feta  Snack: none    Total daily calories: 1200      Exercises 3 times weekly plus a mile and a half of walking

## 2021-06-11 ENCOUNTER — OFFICE VISIT (OUTPATIENT)
Dept: BARIATRICS/WEIGHT MGMT | Age: 36
End: 2021-06-11
Payer: COMMERCIAL

## 2021-06-11 VITALS
WEIGHT: 259.6 LBS | SYSTOLIC BLOOD PRESSURE: 136 MMHG | BODY MASS INDEX: 37.16 KG/M2 | TEMPERATURE: 97 F | OXYGEN SATURATION: 98 % | DIASTOLIC BLOOD PRESSURE: 70 MMHG | HEIGHT: 70 IN | HEART RATE: 90 BPM

## 2021-06-11 DIAGNOSIS — E66.01 MORBID OBESITY DUE TO EXCESS CALORIES (HCC): ICD-10-CM

## 2021-06-11 DIAGNOSIS — E55.9 VITAMIN D DEFICIENCY: Primary | ICD-10-CM

## 2021-06-11 PROCEDURE — G8417 CALC BMI ABV UP PARAM F/U: HCPCS | Performed by: NURSE PRACTITIONER

## 2021-06-11 PROCEDURE — G8427 DOCREV CUR MEDS BY ELIG CLIN: HCPCS | Performed by: NURSE PRACTITIONER

## 2021-06-11 PROCEDURE — 99213 OFFICE O/P EST LOW 20 MIN: CPT | Performed by: NURSE PRACTITIONER

## 2021-06-11 PROCEDURE — 1036F TOBACCO NON-USER: CPT | Performed by: NURSE PRACTITIONER

## 2021-06-11 ASSESSMENT — ENCOUNTER SYMPTOMS
COLOR CHANGE: 0
SORE THROAT: 0
WHEEZING: 0
CHEST TIGHTNESS: 0
BACK PAIN: 0
PHOTOPHOBIA: 0
NAUSEA: 0
DIARRHEA: 0
APNEA: 0
SHORTNESS OF BREATH: 0

## 2021-06-11 NOTE — PROGRESS NOTES
Damion Serum  1985  30 y.o. HPI Damion Serum is here today for weigh check. On Qysmia and tolerating well. Tracking calories 2856-6140 calories/day. Protein powder in coffee for breakfast. Water intake is good. Exercising 3 times/week with . SUBJECT LUISITO:    Chief Complaint   Patient presents with    Follow-up     fu RTC 1 mth qsymia     No past medical history on file. Past Surgical History:   Procedure Laterality Date    CHOLECYSTECTOMY  12/2019    MENISCECTOMY Right 07/2018     Current Outpatient Medications   Medication Sig Dispense Refill    Phentermine-Topiramate 7.5-46 MG CP24 Take 1 tablet by mouth daily for 30 days. Take in am. 30 capsule 0    Phentermine-Topiramate 7.5-46 MG CP24 Take 1 tablet by mouth daily for 30 days. Take in am. 30 capsule 0    vitamin D (ERGOCALCIFEROL) 1.25 MG (77843 UT) CAPS capsule Take 1 capsule by mouth once a week 12 capsule 1    ELDERBERRY PO Take by mouth      vitamin C (ASCORBIC ACID) 500 MG tablet Take 500 mg by mouth daily      Multiple Vitamins-Minerals (MULTIPLE VITAMINS/WOMENS PO) Take by mouth      ferrous sulfate (IRON 325) 325 (65 Fe) MG tablet Take 1 tablet by mouth 2 times daily Take with food. 60 tablet 5     No current facility-administered medications for this visit. Family History   Problem Relation Age of Onset    Lupus Mother     Cancer Mother         non hodgkin's lymphoma    Cancer Father         Prostate    Lupus Sister     Mult Sclerosis Sister     Alzheimer's Disease Maternal Grandmother      Allergies   Allergen Reactions    Ibuprofen Hives and Itching           Sulfa Antibiotics Hives and Itching     Body shaking        Review of Systems   Constitutional: Negative for fatigue and fever. HENT: Negative for congestion, dental problem and sore throat. Eyes: Negative for photophobia and visual disturbance. Respiratory: Negative for apnea, chest tightness, shortness of breath and wheezing. Cardiovascular: Negative for chest pain and leg swelling. Gastrointestinal: Negative for diarrhea and nausea. Endocrine: Negative for cold intolerance and heat intolerance. Genitourinary: Negative for difficulty urinating, dysuria, flank pain, frequency and hematuria. Musculoskeletal: Negative for arthralgias and back pain. Skin: Negative for color change, rash and wound. Allergic/Immunologic: Negative for environmental allergies, food allergies and immunocompromised state. Neurological: Negative for dizziness, weakness, light-headedness and numbness. Hematological: Negative for adenopathy. Does not bruise/bleed easily. Psychiatric/Behavioral: Negative for behavioral problems, confusion, sleep disturbance and suicidal ideas. OBJECTIVE:     /70   Pulse 90   Temp 97 °F (36.1 °C)   Ht 5' 10\" (1.778 m)   Wt 259 lb 9.6 oz (117.8 kg)   LMP 06/01/2021   SpO2 98%   BMI 37.25 kg/m²   Wt Readings from Last 3 Encounters:   06/11/21 259 lb 9.6 oz (117.8 kg)   05/14/21 257 lb 8 oz (116.8 kg)   04/19/21 257 lb (116.6 kg)       Physical Exam  Vitals reviewed. Constitutional:       Appearance: She is obese. HENT:      Head: Normocephalic and atraumatic. Right Ear: External ear normal.      Left Ear: External ear normal.      Nose: Nose normal.      Mouth/Throat:      Mouth: Mucous membranes are moist.   Eyes:      Extraocular Movements: Extraocular movements intact. Pupils: Pupils are equal, round, and reactive to light. Cardiovascular:      Rate and Rhythm: Normal rate and regular rhythm. Pulses: Normal pulses. Heart sounds: Normal heart sounds. Pulmonary:      Effort: Pulmonary effort is normal.      Breath sounds: Normal breath sounds. Abdominal:      General: Bowel sounds are normal.   Musculoskeletal:         General: Normal range of motion. Cervical back: Normal range of motion and neck supple. Skin:     General: Skin is warm and dry.    Neurological: General: No focal deficit present. Mental Status: She is alert and oriented to person, place, and time. Mental status is at baseline. Psychiatric:         Mood and Affect: Mood normal.         Behavior: Behavior normal.        Patient declines any of the most common side effects discussed but not limited to; insomnia, dry mouth, constipation, nervousness, increase in HR and HTN. Patient was informed to call with any of the above side effects or other concerns.      Patient aware of the monthly medication compliance guidelines below and will be removed from Qsymia if the patient does not comply;   · Must meet minimum monthly with provider. · Will schedule weekly for first month for weigh in and vital signs. · Current birth control measures  · Urine drug with no past hx of any substance abuse. · OARRS. reviewed  · Any concerns of ETOH or drug abuse must d/c.   · Patient aware, Needs to show at least monthly weight progress with weigh ins.      Will continue to meet monthly with provider for weigh in's and education on specific weight loss diet plan will be reviewed in group class. Patient was educated today on increasing protein to >60 grams per day, hydration to over 60 ounces per day, and counting calories (5225-5593) per day.      Weight GAIN medications to consider; MAOIs tricyclic antidepressants: A lot of antipsychotics, prednisone, hormonal agents especially progestins and medroxyprogesterone.      Weight LOSS facilitators to consider or alternatives, Effexor, Zoloft and Prozac, and Wellbutrin best for depression/anxiety. Antipsychotics Ziprasidone.      Phentermine 3.75 mg/topiramate 23 mg once daily for 14 days, re-visit with provider. Increase dose to phentermine 7.5 mg/topiramate 46 mg once daily for 12 weeks.      D/C if weight loss <5% after 12 weeks on max dose, taper max dose to QOD>1wk to d/c.    ASSESSMENT/ PLAN:    1. Vitamin D deficiency  - Continue supplementing    2.  Morbid obesity due to excess calories ([address]  - Continue diet and weight loss  - Did gain one lbs this month. Is working out more and feels like clothes are fitting better. Could be muscle mass. - Phentermine-Topiramate 7.5-46 MG CP24; Take 1 tablet by mouth daily for 30 days. Take in am.  Dispense: 30 capsule; Refill: 0  - Continue to monitor for side effects  - Refill sent  - RTC 1 month    No orders of the defined types were placed in this encounter. Return in about 4 weeks (around 7/9/2021) for weight check.     Jerzy Jarrett APRN - CNP, CNP

## 2021-06-11 NOTE — PROGRESS NOTES
Patient to continue weight loss medication, BMI   Denies any new medications or new health problems. No history of CAD, seizures, liver or kidney problems, cardiac hx, uncontrolled HTN, hyperthyroidism, MAOI therapy, glaucoma or hx of drug abuse. Patient declines any of the most common side effects discussed but not limited to; insomnia, dry mouth, constipation, nervousness, increase in HR and HTN. Patient was informed to call with any of the above side effects or other concerns. Patient aware of the monthly medication compliance guidelines below and will be removed from Qsymia if the patient does not comply;   - Must meet minimum monthly with provider.  - Will schedule weekly for first month for weigh in and vital signs.   - Current birth control measures  - Urine drug with no past hx of any substance abuse. - OARRS. - Any concerns of ETOH or drug abuse must d/c.   - Patient aware, Needs to show at least monthly weight progress with weigh ins. Will continue to meet monthly with provider for weigh in's and education on specific weight loss diet plan will be reviewed in group class. Patient was educated today on increasing protein to >60 grams per day, hydration to over 60 ounces per day, and counting calories (3047-7976) per day. Weight GAIN medications to consider; MAOIs tricyclic antidepressants: A lot of antipsychotics, prednisone, hormonal agents especially progestins and medroxyprogesterone. Weight LOSS facilitators to consider or alternatives, Effexor, Zoloft and Prozac, and Wellbutrin best for depression/anxiety. Antipsychotics Ziprasidone. Phentermine 3.75 mg/topiramate 23 mg once daily for 14 days, re-visit with provider. Increase dose to phentermine 7.5 mg/topiramate 46 mg once daily for 12 weeks. D/C if weight loss <5% after 12 weeks on max dose, taper max dose to QOD>1wk to d/c.

## 2023-05-05 ENCOUNTER — NURSE TRIAGE (OUTPATIENT)
Dept: OTHER | Facility: CLINIC | Age: 38
End: 2023-05-05

## 2023-05-06 NOTE — TELEPHONE ENCOUNTER
Location of patient: [place]    Subjective: Caller states \"Pregnant - 24 week and might have a yeast infection. What can I take over the counter. \"    Currently in [place]. Discharge - itching, burning while urinate. Brown discharge when wipes - small pepper flakes. Current Symptoms: itching, burning, discharge, brownish with small pepper flakes. .. Onset:  earlier today    Associated Symptoms: NA    Pain Severity: / mild    Temperature: Denies     What has been tried: Nothing. While on phone purchasing Monistat - advised should speak with her provider. .    Needs to discuss brownish discharge with small pepper flakes. LMP: NA Pregnant: Yes    Recommended disposition:  Speak with on call provider for recommendation of medication for yeast infection and brownish discharge. Paged on call provider for Dr. Graham Awad  [phone]    Care advice provided, patient verbalizes understanding; denies any other questions or concerns; instructed to call back for any new or worsening symptoms. Patient/caller agrees to follow-up with PCP   on call provider to verify if ok to use Monistat for possible    This triage is a result of a call to [address] [place] [address]. Please do not respond to the triage nurse through this encounter. Any subsequent communication should be directly with the patient.       Reason for Disposition   Symptoms of a vaginal yeast infection (i.e., white, thick, cottage-cheese-like, itchy, not bad smelling discharge)   [1] Pregnant 24-36 weeks () AND [2] pinkish or brownish mucous discharge    Protocols used: Pregnancy - Vaginal Discharge-ADULT-

## 2023-11-07 NOTE — TELEPHONE ENCOUNTER
Called patient regarding message.  has had these symptoms for years. States has discussed with current therapist. States symptoms started around 3-4 ago with family stress. Follows with therapist every 3 weeks. NO new symptoms or SI. Will check all vitamin labs and supplement. Recommended psych consult with dr Shawna Salgado, patient refused. Will defer to PCP once labs resulted.       ----- Message from Sindy Neff, MS, RD, LD sent at 4/12/2021  8:54 AM EDT -----  Regarding: Lab work review for Pica  Had video visit with pt in med program - on Qsymia. Pt reports eating paper and craving paper. Asked if this would cause harm. Discussed that yes this can cause toxicity but is also sign of Pica. Pt will need iron and zinc studies to r/o pica and may need psych referral. Please address at visit next week. Thanks! 61 year old female PMH HTN HLD obesity asthma (mild) hypothyroidism IBS fatty liver presents to presurgical testing with diagnosis of unspecified benign mammary dysplasia left breast. Pt with an abnormal surveillance mammogram and biopsy. Pt is scheduled for left breast magseed - localized lumpectomy.